# Patient Record
Sex: FEMALE | Race: WHITE
[De-identification: names, ages, dates, MRNs, and addresses within clinical notes are randomized per-mention and may not be internally consistent; named-entity substitution may affect disease eponyms.]

---

## 2020-10-20 ENCOUNTER — HOSPITAL ENCOUNTER (EMERGENCY)
Dept: HOSPITAL 35 - ER | Age: 60
Discharge: HOME | End: 2020-10-20
Payer: COMMERCIAL

## 2020-10-20 VITALS — WEIGHT: 245 LBS | HEIGHT: 66 IN | BODY MASS INDEX: 39.37 KG/M2

## 2020-10-20 VITALS — SYSTOLIC BLOOD PRESSURE: 122 MMHG | DIASTOLIC BLOOD PRESSURE: 74 MMHG

## 2020-10-20 DIAGNOSIS — E87.2: ICD-10-CM

## 2020-10-20 DIAGNOSIS — R18.8: Primary | ICD-10-CM

## 2020-10-20 LAB
ALBUMIN SERPL-MCNC: 3.9 G/DL (ref 3.4–5)
ALT SERPL-CCNC: 34 U/L (ref 30–65)
ANION GAP SERPL CALC-SCNC: 11 MMOL/L (ref 7–16)
AST SERPL-CCNC: 20 U/L (ref 15–37)
BASOPHILS NFR BLD AUTO: 0.5 % (ref 0–2)
BILIRUB DIRECT SERPL-MCNC: < 0.1 MG/DL
BILIRUB SERPL-MCNC: 0.5 MG/DL (ref 0.2–1)
BILIRUB UR-MCNC: NEGATIVE MG/DL
BUN SERPL-MCNC: 18 MG/DL (ref 7–18)
CALCIUM SERPL-MCNC: 9.4 MG/DL (ref 8.5–10.1)
CHLORIDE SERPL-SCNC: 101 MMOL/L (ref 98–107)
CO2 SERPL-SCNC: 25 MMOL/L (ref 21–32)
COLOR UR: YELLOW
CREAT SERPL-MCNC: 1.7 MG/DL (ref 0.6–1)
EOSINOPHIL NFR BLD: 0.7 % (ref 0–3)
ERYTHROCYTE [DISTWIDTH] IN BLOOD BY AUTOMATED COUNT: 13.6 % (ref 10.5–14.5)
GLUCOSE SERPL-MCNC: 204 MG/DL (ref 74–106)
GRANULOCYTES NFR BLD MANUAL: 84.1 % (ref 36–66)
HCT VFR BLD CALC: 46.9 % (ref 37–47)
HGB BLD-MCNC: 16.2 GM/DL (ref 12–15)
KETONES UR STRIP-MCNC: NEGATIVE MG/DL
LYMPHOCYTES NFR BLD AUTO: 10.6 % (ref 24–44)
MCH RBC QN AUTO: 32.1 PG (ref 26–34)
MCHC RBC AUTO-ENTMCNC: 34.6 G/DL (ref 28–37)
MCV RBC: 92.9 FL (ref 80–100)
MONOCYTES NFR BLD: 4.1 % (ref 1–8)
NEUTROPHILS # BLD: 16.4 THOU/UL (ref 1.4–8.2)
PLATELET # BLD: 509 THOU/UL (ref 150–400)
POTASSIUM SERPL-SCNC: 3.8 MMOL/L (ref 3.5–5.1)
PROT SERPL-MCNC: 7.4 G/DL (ref 6.4–8.2)
RBC # BLD AUTO: 5.05 MIL/UL (ref 4.2–5)
RBC # UR STRIP: (no result) /UL
SODIUM SERPL-SCNC: 137 MMOL/L (ref 136–145)
SP GR UR STRIP: 1.02 (ref 1–1.03)
URINE CLARITY: CLEAR
URINE GLUCOSE-RANDOM*: NEGATIVE
URINE LEUKOCYTES-REFLEX: NEGATIVE
URINE NITRITE-REFLEX: NEGATIVE
URINE PROTEIN (DIPSTICK): (no result)
UROBILINOGEN UR STRIP-ACNC: 0.2 E.U./DL (ref 0.2–1)
WBC # BLD AUTO: 19.6 THOU/UL (ref 4–11)

## 2020-10-21 NOTE — EKG
[facility]
Rachel [place]enna
[place]   [address]                     ELECTROCARDIOGRAM REPORT      
_______________________________________________________________________________
 
Name:       RENEE JEROME        [facility]                     [facility]      [id number]                       Account #:      [id number]  
Admission:  10/20/20    Attend Phys:                          
Discharge:  10/20/20    Date of Birth:  [birth year]  
                                                          Report #: [id number]
                                                                    [id number]
_______________________________________________________________________________
THIS REPORT FOR:  
 
cc:  Nilesh Monahan Damon DO Santiago, Patrick MD [facility]                                         ~
THIS REPORT FOR:   //name//                          
 
                         [facility] ED
                                       
Test Date:    2020-10-20               Test Time:    16:18:54
Pat Name:     RENEE JEROME         Department:   
Patient ID:   [id number]            Room:          
Gender:       F                        Technician:   DARRON LEBRON
:          1960               Requested By: Domi Plascencia
Order Number: [id number] MD:   Hernán Euceda
                                 Measurements
Intervals                              Axis          
Rate:         84                       P:            41
OH:           187                      QRS:          -27
QRSD:         85                       T:            60
QT:           393                                    
QTc:          465                                    
                           Interpretive Statements
Sinus rhythm
Probable left atrial enlargement
Borderline left axis deviation
Abnormal R-wave progression, late transition
No previous ECG available for comparison
Electronically Signed On 10- 9:23:21 CDT by Hernán Euceda
[email or web address]
 
 
 
 
 
 
 
 
 
 
 
 
 
 
  <ELECTRONICALLY SIGNED>
   By: Hernán Euceda MD, FACC    
  10/21/20     0923
D: 10/20/20 1618                           _____________________________________
T: 10/20/20 1618                           Hernán Euceda MD, FACC      /EPI